# Patient Record
Sex: FEMALE | Race: WHITE | Employment: OTHER | URBAN - NONMETROPOLITAN AREA
[De-identification: names, ages, dates, MRNs, and addresses within clinical notes are randomized per-mention and may not be internally consistent; named-entity substitution may affect disease eponyms.]

---

## 2017-10-08 ENCOUNTER — NURSE TRIAGE (OUTPATIENT)
Dept: ADMINISTRATIVE | Age: 67
End: 2017-10-08

## 2019-03-20 ENCOUNTER — HOSPITAL ENCOUNTER (OUTPATIENT)
Dept: INTERVENTIONAL RADIOLOGY/VASCULAR | Age: 69
Discharge: HOME | End: 2019-03-20

## 2019-03-20 DIAGNOSIS — Z13.6 ENCOUNTER FOR SCREENING FOR VASCULAR DISEASE: ICD-10-CM

## 2019-03-20 PROCEDURE — 9900000021 US VASCULAR SCREENING

## 2020-11-11 ENCOUNTER — HOSPITAL ENCOUNTER (OUTPATIENT)
Age: 70
Discharge: HOME | End: 2020-11-11
Payer: MEDICARE

## 2020-11-11 PROCEDURE — U0003 INFECTIOUS AGENT DETECTION BY NUCLEIC ACID (DNA OR RNA); SEVERE ACUTE RESPIRATORY SYNDROME CORONAVIRUS 2 (SARS-COV-2) (CORONAVIRUS DISEASE [COVID-19]), AMPLIFIED PROBE TECHNIQUE, MAKING USE OF HIGH THROUGHPUT TECHNOLOGIES AS DESCRIBED BY CMS-2020-01-R: HCPCS

## 2020-11-11 PROCEDURE — 99211 OFF/OP EST MAY X REQ PHY/QHP: CPT

## 2020-11-13 LAB — SARS-COV-2: DETECTED

## 2021-05-19 ENCOUNTER — HOSPITAL ENCOUNTER (OUTPATIENT)
Dept: GENERAL RADIOLOGY | Age: 71
Discharge: HOME | End: 2021-05-19
Payer: MEDICARE

## 2021-05-19 ENCOUNTER — HOSPITAL ENCOUNTER (OUTPATIENT)
Age: 71
Discharge: HOME | End: 2021-05-19
Payer: MEDICARE

## 2021-05-19 DIAGNOSIS — R06.02 SHORTNESS OF BREATH: ICD-10-CM

## 2021-05-19 PROCEDURE — 71046 X-RAY EXAM CHEST 2 VIEWS: CPT

## 2021-06-10 ENCOUNTER — HOSPITAL ENCOUNTER (OUTPATIENT)
Dept: CT IMAGING | Age: 71
Discharge: HOME | End: 2021-06-10
Payer: MEDICARE

## 2021-06-10 ENCOUNTER — HOSPITAL ENCOUNTER (OUTPATIENT)
Dept: NON INVASIVE DIAGNOSTICS | Age: 71
Discharge: HOME | End: 2021-06-10
Payer: MEDICARE

## 2021-06-10 DIAGNOSIS — R91.1 PULMONARY NODULE: ICD-10-CM

## 2021-06-10 DIAGNOSIS — Z82.49 FAMILY HISTORY OF ISCHEMIC HEART DISEASE: ICD-10-CM

## 2021-06-10 DIAGNOSIS — R00.2 PALPITATIONS: ICD-10-CM

## 2021-06-10 DIAGNOSIS — R06.02 SHORTNESS OF BREATH: ICD-10-CM

## 2021-06-10 DIAGNOSIS — R22.43 LOCALIZED SWELLING, MASS AND LUMP, LOWER LIMB, BILATERAL: ICD-10-CM

## 2021-06-10 LAB — POC CREATININE WHOLE BLOOD: 0.6 MG/DL (ref 0.5–1.2)

## 2021-06-10 PROCEDURE — 93017 CV STRESS TEST TRACING ONLY: CPT | Performed by: NUCLEAR MEDICINE

## 2021-06-10 PROCEDURE — 6360000002 HC RX W HCPCS

## 2021-06-10 PROCEDURE — 3430000000 HC RX DIAGNOSTIC RADIOPHARMACEUTICAL: Performed by: FAMILY MEDICINE

## 2021-06-10 PROCEDURE — 6360000004 HC RX CONTRAST MEDICATION: Performed by: NURSE PRACTITIONER

## 2021-06-10 PROCEDURE — 71260 CT THORAX DX C+: CPT

## 2021-06-10 PROCEDURE — 78452 HT MUSCLE IMAGE SPECT MULT: CPT

## 2021-06-10 PROCEDURE — 93017 CV STRESS TEST TRACING ONLY: CPT

## 2021-06-10 PROCEDURE — A9500 TC99M SESTAMIBI: HCPCS | Performed by: FAMILY MEDICINE

## 2021-06-10 PROCEDURE — 82565 ASSAY OF CREATININE: CPT

## 2021-06-10 RX ADMIN — Medication 9.3 MILLICURIE: at 07:45

## 2021-06-10 RX ADMIN — Medication 32.1 MILLICURIE: at 08:40

## 2021-06-10 RX ADMIN — IOPAMIDOL 85 ML: 755 INJECTION, SOLUTION INTRAVENOUS at 07:16

## 2023-07-19 ENCOUNTER — HOSPITAL ENCOUNTER (OUTPATIENT)
Age: 73
Discharge: HOME | End: 2023-07-19
Payer: MEDICARE

## 2023-07-19 ENCOUNTER — HOSPITAL ENCOUNTER (OUTPATIENT)
Dept: GENERAL RADIOLOGY | Age: 73
Discharge: HOME | End: 2023-07-19
Payer: MEDICARE

## 2023-07-19 DIAGNOSIS — Z01.818 PRE-OP EXAMINATION: ICD-10-CM

## 2023-07-19 LAB
ANION GAP SERPL CALC-SCNC: 13 MEQ/L (ref 8–16)
BUN SERPL-MCNC: 19 MG/DL (ref 7–22)
CALCIUM SERPL-MCNC: 9.5 MG/DL (ref 8.5–10.5)
CHLORIDE SERPL-SCNC: 103 MEQ/L (ref 98–111)
CO2 SERPL-SCNC: 26 MEQ/L (ref 23–33)
CREAT SERPL-MCNC: 0.8 MG/DL (ref 0.4–1.2)
DEPRECATED RDW RBC AUTO: 46.6 FL (ref 35–45)
EKG ATRIAL RATE: 62 BPM
EKG P AXIS: 29 DEGREES
EKG P-R INTERVAL: 180 MS
EKG Q-T INTERVAL: 402 MS
EKG QRS DURATION: 98 MS
EKG QTC CALCULATION (BAZETT): 408 MS
EKG R AXIS: 43 DEGREES
EKG T AXIS: 27 DEGREES
EKG VENTRICULAR RATE: 62 BPM
ERYTHROCYTE [DISTWIDTH] IN BLOOD BY AUTOMATED COUNT: 13.6 % (ref 11.5–14.5)
GFR SERPL CREATININE-BSD FRML MDRD: > 60 ML/MIN/1.73M2
GLUCOSE SERPL-MCNC: 83 MG/DL (ref 70–108)
HCT VFR BLD AUTO: 42 % (ref 37–47)
HGB BLD-MCNC: 13.4 GM/DL (ref 12–16)
MCH RBC QN AUTO: 30 PG (ref 26–33)
MCHC RBC AUTO-ENTMCNC: 31.9 GM/DL (ref 32.2–35.5)
MCV RBC AUTO: 94.2 FL (ref 81–99)
PLATELET # BLD AUTO: 259 THOU/MM3 (ref 130–400)
PMV BLD AUTO: 10.1 FL (ref 9.4–12.4)
POTASSIUM SERPL-SCNC: 4.3 MEQ/L (ref 3.5–5.2)
RBC # BLD AUTO: 4.46 MILL/MM3 (ref 4.2–5.4)
SODIUM SERPL-SCNC: 142 MEQ/L (ref 135–145)
WBC # BLD AUTO: 7 THOU/MM3 (ref 4.8–10.8)

## 2023-07-19 PROCEDURE — 85027 COMPLETE CBC AUTOMATED: CPT

## 2023-07-19 PROCEDURE — 93005 ELECTROCARDIOGRAM TRACING: CPT | Performed by: PHYSICIAN ASSISTANT

## 2023-07-19 PROCEDURE — 80048 BASIC METABOLIC PNL TOTAL CA: CPT

## 2023-07-19 PROCEDURE — 93010 ELECTROCARDIOGRAM REPORT: CPT | Performed by: INTERNAL MEDICINE

## 2023-07-19 PROCEDURE — 71046 X-RAY EXAM CHEST 2 VIEWS: CPT

## 2023-07-19 PROCEDURE — 36415 COLL VENOUS BLD VENIPUNCTURE: CPT

## 2023-08-29 RX ORDER — METOPROLOL SUCCINATE 25 MG/1
25 TABLET, EXTENDED RELEASE ORAL DAILY
COMMUNITY

## 2023-08-29 RX ORDER — PROPAFENONE HYDROCHLORIDE 225 MG/1
225 CAPSULE, EXTENDED RELEASE ORAL 2 TIMES DAILY
COMMUNITY

## 2023-08-29 RX ORDER — ATORVASTATIN CALCIUM 40 MG/1
40 TABLET, FILM COATED ORAL DAILY
COMMUNITY

## 2023-08-29 NOTE — PROGRESS NOTES
NPO after midnight  [employer] and drivers license  Wear comfortable clean clothing  Do not bring jewelry  Shower night before and morning of surgery with a liquid antibacterial soap  Bring list of medications with dosage and how often taken  Follow all instructions given by your physician   needed at discharge  Please limit to 2 visitors for surgery  You must have a responsible adult with you day of surgery and for 24 hours after surgery  Call [phone] for any questions

## 2023-08-29 NOTE — PROGRESS NOTES
In preparation for their surgical procedure above patient was screened for Obstructive Sleep Apnea (CHRIST) using the STOP-Bang Questionnaire by the Pre-Admission Testing department. This is a pre-surgical screening tool for patient safety and serves as a recommendation, this WILL NOT cause cancellation of surgery. STOP-Bang Questionnaire  * Do you currently see a pulmonologist?  No     If yes STOP, do not complete. Patient follows with Dr.     1.  Do you snore loudly (able to be heard in the next room)? No    2. Do you often feel tired or sleepy during the daytime? No       3. Has anyone ever told you that you stop breathing during your sleep? No    4. Do you have or are you being treated for high blood pressure? No      5. BMI more than 35? BMI (Calculated): 32.2        No    6. Age over 48 years? 67 y.o. Yes    7. Neck Circumference greater than 17 inches for male or 16 inches for female? Measured           (visits only)            Not Applicable    8. Gender Male? No      TOTAL SCORE: 1    CHRIST - Low Risk : Yes to 0 - 2 questions  CHRIST - Intermediate Risk : Yes to 3 - 4 questions  CHRIST - High Risk : Yes to 5 - 8 questions    Adapted from:   STOP Questionnaire: A Tool to Screen Patients for Obstructive Sleep Apnea   MICHAEL Cade.C.P.C., [facility], M.B.B.S., Mauricio Jones M.D., Vernon Patterson. Sarita Mari, Ph.D., TEVIN Tadeo.B.B.S., Ulises Gomez M.Sc., Bart Mathews M.D., Sadie Mast. LITTLE Fonseca.P.C.    Anesthesiology 2008; [phone] Copyright 2008, the [place] of [employer], [address] [facility] [place].   ----------------------------------------------------------------------------------------------------------------

## 2023-09-01 ENCOUNTER — ANESTHESIA (OUTPATIENT)
Dept: OPERATING ROOM | Age: 73
End: 2023-09-01
Payer: MEDICARE

## 2023-09-01 ENCOUNTER — HOSPITAL ENCOUNTER (OUTPATIENT)
Age: 73
Setting detail: OUTPATIENT SURGERY
Discharge: HOME | End: 2023-09-01
Attending: SPECIALIST | Admitting: SPECIALIST
Payer: MEDICARE

## 2023-09-01 ENCOUNTER — ANESTHESIA EVENT (OUTPATIENT)
Dept: OPERATING ROOM | Age: 73
End: 2023-09-01
Payer: MEDICARE

## 2023-09-01 VITALS
HEART RATE: 56 BPM | BODY MASS INDEX: 34.52 KG/M2 | DIASTOLIC BLOOD PRESSURE: 61 MMHG | SYSTOLIC BLOOD PRESSURE: 130 MMHG | WEIGHT: 202.2 LBS | OXYGEN SATURATION: 96 % | HEIGHT: 64 IN | TEMPERATURE: 97.1 F | RESPIRATION RATE: 16 BRPM

## 2023-09-01 DIAGNOSIS — C44.722 SCC (SQUAMOUS CELL CARCINOMA), LEG, RIGHT: ICD-10-CM

## 2023-09-01 PROCEDURE — 7100000011 HC PHASE II RECOVERY - ADDTL 15 MIN: Performed by: SPECIALIST

## 2023-09-01 PROCEDURE — 6360000002 HC RX W HCPCS: Performed by: NURSE ANESTHETIST, CERTIFIED REGISTERED

## 2023-09-01 PROCEDURE — 3600000012 HC SURGERY LEVEL 2 ADDTL 15MIN: Performed by: SPECIALIST

## 2023-09-01 PROCEDURE — 88305 TISSUE EXAM BY PATHOLOGIST: CPT

## 2023-09-01 PROCEDURE — 7100000010 HC PHASE II RECOVERY - FIRST 15 MIN: Performed by: SPECIALIST

## 2023-09-01 PROCEDURE — 2709999900 HC NON-CHARGEABLE SUPPLY: Performed by: SPECIALIST

## 2023-09-01 PROCEDURE — 2500000003 HC RX 250 WO HCPCS: Performed by: SPECIALIST

## 2023-09-01 PROCEDURE — 3700000001 HC ADD 15 MINUTES (ANESTHESIA): Performed by: SPECIALIST

## 2023-09-01 PROCEDURE — 2500000003 HC RX 250 WO HCPCS: Performed by: NURSE ANESTHETIST, CERTIFIED REGISTERED

## 2023-09-01 PROCEDURE — 3700000000 HC ANESTHESIA ATTENDED CARE: Performed by: SPECIALIST

## 2023-09-01 PROCEDURE — 2580000003 HC RX 258: Performed by: SPECIALIST

## 2023-09-01 PROCEDURE — L4361 PNEUMA/VAC WALK BOOT PRE OTS: HCPCS | Performed by: SPECIALIST

## 2023-09-01 PROCEDURE — 3600000002 HC SURGERY LEVEL 2 BASE: Performed by: SPECIALIST

## 2023-09-01 PROCEDURE — 88331 PATH CONSLTJ SURG 1 BLK 1SPC: CPT

## 2023-09-01 RX ORDER — PROPOFOL 10 MG/ML
INJECTION, EMULSION INTRAVENOUS PRN
Status: DISCONTINUED | OUTPATIENT
Start: 2023-09-01 | End: 2023-09-01 | Stop reason: SDUPTHER

## 2023-09-01 RX ORDER — CEFAZOLIN SODIUM 1 G/3ML
INJECTION, POWDER, FOR SOLUTION INTRAMUSCULAR; INTRAVENOUS PRN
Status: DISCONTINUED | OUTPATIENT
Start: 2023-09-01 | End: 2023-09-01 | Stop reason: SDUPTHER

## 2023-09-01 RX ORDER — HYDROCODONE BITARTRATE AND ACETAMINOPHEN 5; 325 MG/1; MG/1
1 TABLET ORAL EVERY 6 HOURS PRN
Qty: 12 TABLET | Refills: 0 | Status: SHIPPED | OUTPATIENT
Start: 2023-09-01 | End: 2023-09-04

## 2023-09-01 RX ORDER — SODIUM CHLORIDE 9 MG/ML
INJECTION, SOLUTION INTRAVENOUS CONTINUOUS
Status: DISCONTINUED | OUTPATIENT
Start: 2023-09-01 | End: 2023-09-01 | Stop reason: HOSPADM

## 2023-09-01 RX ORDER — EPHEDRINE SULFATE/0.9% NACL/PF 50 MG/5 ML
SYRINGE (ML) INTRAVENOUS PRN
Status: DISCONTINUED | OUTPATIENT
Start: 2023-09-01 | End: 2023-09-01 | Stop reason: SDUPTHER

## 2023-09-01 RX ORDER — LIDOCAINE HYDROCHLORIDE AND EPINEPHRINE 10; 10 MG/ML; UG/ML
INJECTION, SOLUTION INFILTRATION; PERINEURAL PRN
Status: DISCONTINUED | OUTPATIENT
Start: 2023-09-01 | End: 2023-09-01 | Stop reason: ALTCHOICE

## 2023-09-01 RX ADMIN — PROPOFOL 50 MG: 10 INJECTION, EMULSION INTRAVENOUS at 10:34

## 2023-09-01 RX ADMIN — Medication 10 MG: at 10:58

## 2023-09-01 RX ADMIN — CEFAZOLIN 2 G: 1 INJECTION, POWDER, FOR SOLUTION INTRAMUSCULAR; INTRAVENOUS at 10:28

## 2023-09-01 RX ADMIN — PROPOFOL 50 MG: 10 INJECTION, EMULSION INTRAVENOUS at 11:09

## 2023-09-01 RX ADMIN — PROPOFOL 50 MG: 10 INJECTION, EMULSION INTRAVENOUS at 10:59

## 2023-09-01 RX ADMIN — Medication 10 MG: at 10:57

## 2023-09-01 RX ADMIN — PROPOFOL 50 MG: 10 INJECTION, EMULSION INTRAVENOUS at 10:48

## 2023-09-01 RX ADMIN — PROPOFOL 50 MG: 10 INJECTION, EMULSION INTRAVENOUS at 11:03

## 2023-09-01 RX ADMIN — SODIUM CHLORIDE: 9 INJECTION, SOLUTION INTRAVENOUS at 10:26

## 2023-09-01 RX ADMIN — PROPOFOL 50 MG: 10 INJECTION, EMULSION INTRAVENOUS at 10:42

## 2023-09-01 ASSESSMENT — PAIN SCALES - GENERAL: PAINLEVEL_OUTOF10: 0

## 2023-09-01 ASSESSMENT — PAIN - FUNCTIONAL ASSESSMENT: PAIN_FUNCTIONAL_ASSESSMENT: NONE - DENIES PAIN

## 2023-09-01 NOTE — DISCHARGE INSTRUCTIONS
POST OPERATIVE INSTRUCTION SHEET  SKIN TUMOR/LESION REMOVAL          Activity:    No strenuous activity for 48 hours  No activity that stresses the suture closure/incision  Regular diet  ABSOLUTELY NO NICOTINE OF ANY TYPE    Wound Care:  Leave dressing on lower leg in place. Do not remove. Do not get wet. Wear walking boot when out of bed. You may remove it at night to sleep. Dermabond was used on your upper thigh, do not scrub, rub or pick at adhesive glue    Limitations:  No swimming, hot tub, sauna or soaking in a bathtub    Prescriptions: Take exactly as prescribed    Follow-Up:  Follow up in the office September 7 at 3:00 pm      Notify our office if you experience any of the following:   Develop a fever (temperature is greater than 100.5F)   Develop redness greater than 1 cm around incision or red streaks up extremity   Have any excess bleeding/ increased drainage or swelling at the incision site    *Note:  Your pathology results will be reviewed with you at your scheduled follow-up appointment. *A prescription for Canelo Corona has been sent to your pharmacy      How Do you Prevent Surgical Site Infections? *HAND WASHING     *STOP SHAVING   Wash your hands multiple times daily  Do not shave incisional area 48 hours before   with soap for 20 seconds. or until 2 weeks after surgery. This can   Before eating and wound care. cause tiny cuts in the skin which can lead to infection. After using the bathroom or touching pets. *BALANCE      Times of activity and rest.      Stay away from people who may be sick. *QUIT SMOKING      Cigarette smoking leads to slow wound      healing. [facility] YOUR BODY      Follow your doctor's instructions on washing the night before and the day of your surgery. You may use products like:  Dial antibacterial soap, Hibiclens, Gena-hex.          Do not share personal items such as towels, wash cloths, or toothbrush. *BLOOD SUGAR CONTROL                  Lower blood sugars help to prevent                          infections, scarring, and slow wound                          healing. Blood sugar goal less than 200. *EAT HEALTHY       Eat plenty of protein,        vegetables, and fruits. Limit sugars and processed foods. *BED LINENS      *DRESSING CHANGE        Make sure your bed linens are freshly   Follow your discharge instructions for wound        washed. NO pets in the bed. Your animals care and bathing:       carry bacteria in their fur, skin or feathers  ~Keep your dressing clean and dry       which can enter site, causing infection. ~Clean and washed clothes are important                                                            ~Call the doctor if you develop a fever,                                                       your incision has redness, an odor or                                                             yellowish/greenish drainage.

## 2023-09-01 NOTE — PROGRESS NOTES
1125 Patient arrived to phase II via chair. Spontaneous respiraitons even and unlabored. Placed on monitor--VSS. Report received from [address] Assessment completed. Patient is alert and oriented x4. IV capped off-- no complications. Patient denies pain--will monitor. Surgical sites clean and dry. 1129 Drink provided. Pt. Denies all other needs at this time. Call light handed to pt. Chair locked. Family at bedside. [address] at bedside. Pt states readiness for discharge. 1153 Pt. Standing at bedside. With stand by assist of RN. Pt. Denies weakness or dizziness. 1155 INT removed no Complications noted. 1158 Discharge instructions reviewed with the pt. And S.O. All questions addressed. AVS handed to spouse. 1200 Pt. Getting self dressed. 1203 Family left to get private vehicle    1205 Pt. Ambulated to private vehicle in stable condition with stand by assist of RN.

## 2023-09-01 NOTE — OP NOTE
me on 9/1/2023 at 11:23 AM Operative Note      Patient: Arabella Yip  YOB: 1950  MRN: [id number]    Date of Procedure: 9/1/2023    Pre-Op Diagnosis Codes:     * SCC (squamous cell carcinoma), leg, right [C44.722]    Post-Op Diagnosis: Same       Procedure(s):  EXCISION SCC RIGHT MEDIAL LOWER LEG    Surgeon(s):  Leonardo Campos MD    Assistant:   Physician Assistant: Rl Greene PA-C    Anesthesia: Monitor Anesthesia Care    Estimated Blood Loss (mL): Minimal    Complications: None    Specimens:   ID Type Source Tests Collected by Time Destination   A : right medial lower leg squamous cell carcinoma Tissue Leg SURGICAL PATHOLOGY Leonardo Campos MD 9/1/2023 8750        Implants:  * No implants in log *      Drains: * No LDAs found *    Findings: 1.4cm right medial lower leg (ankle) squamous cell carcinoma            Detailed Description of Procedure:   2.4cm excision of right medial lower leg (ankle) squamous cell carcinoma creating a 4cm2 defect that was repaired with a full thickness skin graft (4 cm2) (CPT 23140 & 24708)      Electronically signed by Leonardo Campos MD on 9/1/2023 at 11:23 AM

## 2023-09-01 NOTE — ANESTHESIA POSTPROCEDURE EVALUATION
Department of Anesthesiology  Postprocedure Note    Patient: Home Moore  MRN: [id number]  YOB: 1950  Date of evaluation: 9/1/2023      Procedure Summary     Date: 09/01/23 Room / Location: [facility] 01 / [facility]    Anesthesia Start: 1024 Anesthesia Stop: 4451    Procedure: EXCISION SCC RIGHT MEDIAL LOWER LEG (Right) Diagnosis:       SCC (squamous cell carcinoma), leg, right      (SCC (squamous cell carcinoma), leg, right [M46.407])    Surgeons: Jodi Palomino MD Responsible Provider: Alvin Garcia DO    Anesthesia Type: MAC ASA Status: 2          Anesthesia Type: No value filed.     Ge Phase I:      Ge Phase II: Ge Score: 10      Anesthesia Post Evaluation    Patient location during evaluation: bedside  Patient participation: complete - patient participated  Level of consciousness: awake and alert  Pain score: 0  Airway patency: patent  Nausea & Vomiting: no nausea and no vomiting  Complications: no  Cardiovascular status: hemodynamically stable and blood pressure returned to baseline  Respiratory status: spontaneous ventilation, room air and acceptable  Hydration status: stable  Pain management: adequate and satisfactory to patient

## 2023-09-01 NOTE — H&P
Jose  History and Physical Update    Pt Name: Kunal Brown  MRN: [id number]  YOB: 1950  Date of evaluation: 9/1/2023    I have examined the patient and reviewed the H&P/Consult and there are no changes to the patient or plans.       Yamil Mora MD  Electronically signed 9/1/2023 at 10:23 AM

## 2025-03-04 ENCOUNTER — HOSPITAL ENCOUNTER (OUTPATIENT)
Dept: GENERAL RADIOLOGY | Age: 75
Discharge: HOME | End: 2025-03-04
Payer: MEDICARE

## 2025-03-04 ENCOUNTER — HOSPITAL ENCOUNTER (OUTPATIENT)
Age: 75
Discharge: HOME | End: 2025-03-04
Payer: MEDICARE

## 2025-03-04 DIAGNOSIS — Z01.818 PRE-OP EVALUATION: ICD-10-CM

## 2025-03-04 LAB
ANION GAP SERPL CALC-SCNC: 9 MEQ/L (ref 8–16)
BUN SERPL-MCNC: 16 MG/DL (ref 8–23)
CALCIUM SERPL-MCNC: 9.2 MG/DL (ref 8.8–10.2)
CHLORIDE SERPL-SCNC: 103 MEQ/L (ref 98–111)
CO2 SERPL-SCNC: 28 MEQ/L (ref 22–29)
CREAT SERPL-MCNC: 0.8 MG/DL (ref 0.5–0.9)
DEPRECATED RDW RBC AUTO: 42.6 FL (ref 35–45)
EKG ATRIAL RATE: 55 BPM
EKG P AXIS: 43 DEGREES
EKG P-R INTERVAL: 192 MS
EKG Q-T INTERVAL: 422 MS
EKG QRS DURATION: 102 MS
EKG QTC CALCULATION (BAZETT): 403 MS
EKG R AXIS: 28 DEGREES
EKG T AXIS: 26 DEGREES
EKG VENTRICULAR RATE: 55 BPM
ERYTHROCYTE [DISTWIDTH] IN BLOOD BY AUTOMATED COUNT: 13.1 % (ref 11.5–14.5)
GFR SERPL CREATININE-BSD FRML MDRD: 77 ML/MIN/1.73M2
GLUCOSE SERPL-MCNC: 83 MG/DL (ref 74–109)
HCT VFR BLD AUTO: 39 % (ref 37–47)
HGB BLD-MCNC: 13.2 GM/DL (ref 12–16)
MCH RBC QN AUTO: 30 PG (ref 26–33)
MCHC RBC AUTO-ENTMCNC: 33.8 GM/DL (ref 32.2–35.5)
MCV RBC AUTO: 88.6 FL (ref 81–99)
PLATELET # BLD AUTO: 235 THOU/MM3 (ref 130–400)
PMV BLD AUTO: 10.8 FL (ref 9.4–12.4)
POTASSIUM SERPL-SCNC: 4.5 MEQ/L (ref 3.5–5.2)
RBC # BLD AUTO: 4.4 MILL/MM3 (ref 4.2–5.4)
SODIUM SERPL-SCNC: 140 MEQ/L (ref 135–145)
WBC # BLD AUTO: 5.9 THOU/MM3 (ref 4.8–10.8)

## 2025-03-04 PROCEDURE — 36415 COLL VENOUS BLD VENIPUNCTURE: CPT

## 2025-03-04 PROCEDURE — 85027 COMPLETE CBC AUTOMATED: CPT

## 2025-03-04 PROCEDURE — 80048 BASIC METABOLIC PNL TOTAL CA: CPT

## 2025-03-04 PROCEDURE — 71046 X-RAY EXAM CHEST 2 VIEWS: CPT

## 2025-03-04 PROCEDURE — 93005 ELECTROCARDIOGRAM TRACING: CPT | Performed by: SPECIALIST

## 2025-03-14 NOTE — PROGRESS NOTES
In preparation for their surgical procedure above patient was screened for Obstructive Sleep Apnea (CHRIST) using the STOP-Bang Questionnaire by the Pre-Admission Testing department.  This is a pre-surgical screening tool for patient safety and serves as a recommendation, this WILL NOT cause cancellation of surgery.    STOP-Bang Questionnaire  * Do you currently see a pulmonologist?  No     If yes STOP, do not complete.  Patient follows with DrDenilson     1.  Do you snore loudly (able to be heard in the next room)?      No    2.  Do you often feel tired or sleepy during the daytime?          No       3.  Has anyone ever told you that you stop breathing during your sleep?       No    4.  Do you have or are you being treated for high blood pressure?          No      5.  BMI more than 35?  BMI (Calculated): 34.1        No    6.  Age over 50 years? 74 y.o.      Yes    7.  Neck Circumference greater than 17 inches for male or 16 inches for female?  Measured           (visits only)            Not Applicable    8.  Gender Male?                 No      TOTAL SCORE: 1    CHRIST - Low Risk : Yes to 0 - 2 questions  CHRIST - Intermediate Risk : Yes to 3 - 4 questions  CHRIST - High Risk : Yes to 5 - 8 questions    Adapted from:   STOP Questionnaire: A Tool to Screen Patients for Obstructive Sleep Apnea   DUTCH Cohen.R.C.P.C., Arvin Frances M.B.B.S., Sadie Del Real M.D., Nasra Zamudio, Ph.D., TEVIN York.B.B.S., TEVIN Melgar.Sc., Soy Garrett M.D., Alex Church F.R.C.P.C.   Anesthesiology 2008; 108:812-21 Copyright 2008, the [employer] Society of Anesthesiologists, Inc. [employer]   ----------------------------------------------------------------------------------------------------------------

## 2025-03-14 NOTE — PROGRESS NOTES
EKG 3/4/25 given to anesthesia for review. Per Dr. Augustus santana to proceed at the surgery center 3/21 without further intervention

## 2025-03-14 NOTE — PROGRESS NOTES
NPO after midnight (may have 8 oz of water up to 2 hours before surgery)  Bring insurance info and drivers license  Wear comfortable clean clothing  Do not bring jewelry  Shower night before and morning of surgery with a liquid antibacterial soap  Bring list of medications with dosage and how often taken  Follow all instructions given by your physician   needed at discharge  You must have a responsible adult with you day of surgery and for 24 hours after surgery  Call [phone] for any questions

## 2025-03-21 ENCOUNTER — ANESTHESIA EVENT (OUTPATIENT)
Dept: OPERATING ROOM | Age: 75
End: 2025-03-21
Payer: MEDICARE

## 2025-03-21 ENCOUNTER — ANESTHESIA (OUTPATIENT)
Dept: OPERATING ROOM | Age: 75
End: 2025-03-21
Payer: MEDICARE

## 2025-03-21 ENCOUNTER — HOSPITAL ENCOUNTER (OUTPATIENT)
Age: 75
Setting detail: OUTPATIENT SURGERY
Discharge: HOME | End: 2025-03-21
Attending: SPECIALIST | Admitting: SPECIALIST
Payer: MEDICARE

## 2025-03-21 VITALS
RESPIRATION RATE: 16 BRPM | OXYGEN SATURATION: 98 % | DIASTOLIC BLOOD PRESSURE: 68 MMHG | TEMPERATURE: 96.8 F | SYSTOLIC BLOOD PRESSURE: 107 MMHG | HEART RATE: 57 BPM | WEIGHT: 196.8 LBS | BODY MASS INDEX: 33.6 KG/M2 | HEIGHT: 64 IN

## 2025-03-21 PROCEDURE — 2500000003 HC RX 250 WO HCPCS: Performed by: REGISTERED NURSE

## 2025-03-21 PROCEDURE — 7100000011 HC PHASE II RECOVERY - ADDTL 15 MIN: Performed by: SPECIALIST

## 2025-03-21 PROCEDURE — 3700000000 HC ANESTHESIA ATTENDED CARE: Performed by: SPECIALIST

## 2025-03-21 PROCEDURE — 7100000010 HC PHASE II RECOVERY - FIRST 15 MIN: Performed by: SPECIALIST

## 2025-03-21 PROCEDURE — 6360000002 HC RX W HCPCS: Performed by: REGISTERED NURSE

## 2025-03-21 PROCEDURE — 2500000003 HC RX 250 WO HCPCS: Performed by: SPECIALIST

## 2025-03-21 PROCEDURE — 3600000002 HC SURGERY LEVEL 2 BASE: Performed by: SPECIALIST

## 2025-03-21 PROCEDURE — 2709999900 HC NON-CHARGEABLE SUPPLY: Performed by: SPECIALIST

## 2025-03-21 PROCEDURE — 3600000012 HC SURGERY LEVEL 2 ADDTL 15MIN: Performed by: SPECIALIST

## 2025-03-21 PROCEDURE — 6360000002 HC RX W HCPCS: Performed by: SPECIALIST

## 2025-03-21 PROCEDURE — 3700000001 HC ADD 15 MINUTES (ANESTHESIA): Performed by: SPECIALIST

## 2025-03-21 RX ORDER — ONDANSETRON 2 MG/ML
INJECTION INTRAMUSCULAR; INTRAVENOUS
Status: DISCONTINUED | OUTPATIENT
Start: 2025-03-21 | End: 2025-03-21 | Stop reason: SDUPTHER

## 2025-03-21 RX ORDER — EPHEDRINE SULFATE/0.9% NACL/PF 25 MG/5 ML
SYRINGE (ML) INTRAVENOUS
Status: DISCONTINUED | OUTPATIENT
Start: 2025-03-21 | End: 2025-03-21 | Stop reason: SDUPTHER

## 2025-03-21 RX ORDER — LIDOCAINE HYDROCHLORIDE 20 MG/ML
INJECTION, SOLUTION INTRAVENOUS
Status: DISCONTINUED | OUTPATIENT
Start: 2025-03-21 | End: 2025-03-21 | Stop reason: SDUPTHER

## 2025-03-21 RX ORDER — LIDOCAINE HYDROCHLORIDE AND EPINEPHRINE 10; 10 MG/ML; UG/ML
INJECTION, SOLUTION INFILTRATION; PERINEURAL PRN
Status: DISCONTINUED | OUTPATIENT
Start: 2025-03-21 | End: 2025-03-21 | Stop reason: ALTCHOICE

## 2025-03-21 RX ADMIN — PROPOFOL 130 MCG/KG/MIN: 10 INJECTION, EMULSION INTRAVENOUS at 07:34

## 2025-03-21 RX ADMIN — EPHEDRINE SULFATE 5 MG: 5 INJECTION INTRAVENOUS at 08:12

## 2025-03-21 RX ADMIN — EPHEDRINE SULFATE 5 MG: 5 INJECTION INTRAVENOUS at 07:48

## 2025-03-21 RX ADMIN — LIDOCAINE HYDROCHLORIDE 50 MG: 20 INJECTION, SOLUTION INTRAVENOUS at 07:36

## 2025-03-21 RX ADMIN — WATER 2000 MG: 1 INJECTION INTRAMUSCULAR; INTRAVENOUS; SUBCUTANEOUS at 07:45

## 2025-03-21 RX ADMIN — ONDANSETRON 4 MG: 2 INJECTION INTRAMUSCULAR; INTRAVENOUS at 08:15

## 2025-03-21 ASSESSMENT — PAIN - FUNCTIONAL ASSESSMENT
PAIN_FUNCTIONAL_ASSESSMENT: NONE - DENIES PAIN
PAIN_FUNCTIONAL_ASSESSMENT: 0-10

## 2025-03-21 NOTE — ANESTHESIA PRE PROCEDURE
Department of Anesthesiology  Preprocedure Note       Name:  Angella Alanis   Age:  74 y.o.  :  1950                                          MRN:  [id number]         Date:  3/21/2025      Surgeon: Surgeon(s):  Jah Molina MD    Procedure: Procedure(s):  MOHS DEFECT REPAIR SCC RIGHT LATERAL FOREHEAD    Medications prior to admission:   Prior to Admission medications    Medication Sig Start Date End Date Taking? Authorizing Provider   atorvastatin (LIPITOR) 40 MG tablet Take 1 tablet by mouth daily   Yes Manuel Radford MD   metoprolol succinate (TOPROL XL) 25 MG extended release tablet Take 1 tablet by mouth daily   Yes Manuel Radford MD   estrogens, conjugated, (PREMARIN) 0.3 MG tablet Take 1 tablet by mouth daily   Yes Manuel Radford MD   propafenone (RYTHMOL SR) 225 MG extended release capsule Take 1 capsule by mouth 2 times daily   Yes Manuel Radford MD   Multiple Vitamin (MULTIVITAMIN PO) Take by mouth daily    Manuel Radford MD   VITAMIN D PO Take by mouth daily    Manuel Radford MD   VITAMIN E PO Take by mouth daily    Manuel Radford MD   Omega-3 Fatty Acids (FISH OIL PO) Take by mouth daily    Manuel Radford MD       Current medications:    Current Facility-Administered Medications   Medication Dose Route Frequency Provider Last Rate Last Admin   • ceFAZolin (ANCEF) 2,000 mg in sterile water 20 mL IV syringe  2,000 mg IntraVENous Once Jah Molina MD           Allergies:  No Known Allergies    Problem List:  There is no problem list on file for this patient.      Past Medical History:        Diagnosis Date   • Cancer (HCC)     skin   • Palpitations    • Postural orthostatic tachycardia syndrome (POTS)        Past Surgical History:        Procedure Laterality Date   • HYSTERECTOMY (CERVIX STATUS UNKNOWN)     • LEG BIOPSY EXCISION Right 2023    EXCISION SCC RIGHT MEDIAL LOWER LEG performed by Jah Molina MD at [facility] SURGERY

## 2025-03-21 NOTE — H&P
[facility]  History and Physical Update    Pt Name: Angella Alanis  MRN: [id number]  YOB: 1950  Date of evaluation: 3/21/2025    I have examined the patient and reviewed the H&P/Consult and there are no changes to the patient or plans.      Jah Molina MD  Electronically signed 3/21/2025 at 7:16 AM

## 2025-03-21 NOTE — DISCHARGE INSTRUCTIONS
POST OPERATIVE INSTRUCTION SHEET  SKIN TUMOR/LESION REMOVAL              Activity:     No strenuous activity for 48 hours  No activity that stresses the suture closure/incision  Regular diet  ABSOLUTELY NO NICOTINE OF ANY TYPE     Wound Care:  Leave yellow dressing on temple in place. Do not remove yellow dressing and do not get it wet.  Dermabond was used in front of your ear, do not scrub, rub or pick at adhesive glue  Recommend sleeping in a recliner or with head elevated for next 2-3 nights.     Limitations:  No swimming, hot tub, sauna or soaking in a bathtub     Prescriptions:  Take exactly as prescribed  Complete antibiotic as prescribed, next dose tonight with supper  May take tylenol for pain     Follow-Up:March 27 at 2:30PM   Please come to your post op appointment at our office at this address: [address] [phone]        Notify our office if you experience any of the following:  Develop a fever (temperature is greater than 100.5F)  Develop redness greater than 1 cm around incision or red streaks up extremity  Have any excess bleeding/ increased drainage or swelling at the incision site    How Do you Prevent Surgical Site Infections?       *HAND WASHING     *STOP SHAVING   Wash your hands multiple times daily  Do not shave incisional area 48 hours before   with soap for 20 seconds.    or until 2 weeks after surgery.  This can   Before eating and wound care.   cause tiny cuts in the skin which can lead to infection.   After using the bathroom or touching pets.                                    *BALANCE      Times of activity and rest.      Stay away from people who may be sick.             *QUIT SMOKING      Cigarette smoking leads to slow wound      healing.                   *WASH YOUR BODY      Follow your doctor's instructions on washing the night before and the day of your surgery.       You may use products like:  Dial antibacterial soap, Hibiclens, Gena-hex.         Do not share personal

## 2025-03-21 NOTE — OP NOTE
Operative Note    Patient name: Angella Alanis             Medical Record Number: [id number]    Primary Care Physician: Travis Willis MD     1950    Date of Procedure: 3/21/2025    Pre-operative Diagnosis: 5cm2 defect of right lateral forehead s/p MOHS for squamous cell carcinoma    Post-operative Diagnosis: Same    Procedure Performed: Full thickness skin graft (5 cm2) repair of right lateral forehead defect    Surgeons/Assistants: Dr. Jah Molina MD     Estimated Blood Loss: 3ml     Complications: none immediately appreciated    Procedure:    With the patient lying in the supine position and under adequate anesthesia per the anesthesia team.   Local anesthesia consisting of 11 ml of 1% Lidocaine 1:100,000 with epinephrine solution of the donor site of the right preauricular sulcus as well as the right lateral forehead defect.  The area was prepped and draped in the standard surgical fashion.  The patient has very thin skin and a large (5cm2) defect which could not be closed primarily, therefore a full thickness skin graft was taken.  It was defatted and secured in position with a 3-0 silk suture tie and then a 5-0 fast absorbable suture was placed in a simple running fashion.  A Bacitracin Xeroform and moist cotton ball tie over bolster was secured using the tails of the silk sutures.  The donor site was closed with a 4-0 Monocryl suture placed in interrupted buried fashion and then Histoacryl respectively.  The patient tolerated the procedure well and remained hemodynamically stable throughout the procedure and was quite comfortable throughout the operative course.    Clinical staging for cancer cases:  Ct  Cn  Cm    Jah Molina MD  Electronically signed by me on 3/21/2025 at 7:56 AMOperative Note      Patient: Angella Alanis  YOB: 1950  MRN: [id number]    Date of Procedure: 3/21/2025    Pre-Op Diagnosis Codes:      * Squamous cell carcinoma of forehead [C44.329]    Post-Op

## 2025-03-21 NOTE — ANESTHESIA POSTPROCEDURE EVALUATION
Department of Anesthesiology  Postprocedure Note    Patient: Angella Alanis  MRN: [id number]  YOB: 1950  Date of evaluation: 3/21/2025    Procedure Summary       Date: 03/21/25 Room / Location: [address]    Anesthesia Start: 0730 Anesthesia Stop: 0830    Procedure: MOHS DEFECT REPAIR SCC RIGHT LATERAL FOREHEAD with ftsg from right cheek (Right: Face) Diagnosis:       Squamous cell carcinoma of forehead      (Squamous cell carcinoma of forehead [C44.329])    Surgeons: Jah Molina MD Responsible Provider: Christiano Douglas DO    Anesthesia Type: MAC ASA Status: 2            Anesthesia Type: No value filed.    Ge Phase I:      Ge Phase II: Ge Score: 9    Anesthesia Post Evaluation    Patient location during evaluation: bedside  Patient participation: complete - patient participated  Level of consciousness: awake  Airway patency: patent  Nausea & Vomiting: no nausea  Cardiovascular status: hemodynamically stable  Respiratory status: acceptable  Hydration status: stable  Pain management: adequate    No notable events documented.

## 2025-03-21 NOTE — PROGRESS NOTES
0830-Patient to Phase II via cart. Report received from Jo Ann HENNESSY. Patient drowsy but responsive.Vitals obtained and stable. Respirations even and unlabored on room air. Patient denies pain, nausea. Patient with non-productive cough. Patient with bolster right forehead. Donor site in front of ear intact with glue. No drainage noted at either site. Patient instructed on call light use.  0835-Patient provided with snack and drink. Denies needs. Call light remains in reach. Family to room.  0900-Patients IV removed with no complications. Patient getting dressed. Discharge instructions reviewed. Verbalized understanding.  0915-Patient meets discharge criteria.  Discharged in stable condition with responsible . All belongings given to patient. Patient ambulated to car with assistance from RN. Patient tolerated well.

## (undated) DEVICE — BANDAGE,GAUZE,4.5"X4.1YD,STERILE,LF: Brand: MEDLINE

## (undated) DEVICE — BANDAGE COMPR M W4INXL10YD WHT BGE VELC E MTRX HK AND LOOP

## (undated) DEVICE — GLOVE ORANGE PI 7   MSG9070

## (undated) DEVICE — COTTON BALL ST

## (undated) DEVICE — BRACE WALKING EMBEDDED SOLE MED STD ANK ROCKER BTM INLINE

## (undated) DEVICE — GLOVE SURG SZ 8 L11.77IN FNGR THK9.8MIL STRW LTX POLYMER

## (undated) DEVICE — PACK PROCEDURE SURG PLAS SC MIN SRHP LF

## (undated) DEVICE — SUTURE MONOCRYL SZ 4-0 L18IN ABSRB UD P-3 L13MM 3/8 CIR PRIM Y494G

## (undated) DEVICE — GOWN,SIRUS,NON REINFRCD,LARGE,SET IN SL: Brand: MEDLINE

## (undated) DEVICE — CHLORAPREP 26ML CLEAR

## (undated) DEVICE — SOLUTION IRRIG 1500ML STRL H2O USP POUR PLAS BTL

## (undated) DEVICE — Device